# Patient Record
Sex: MALE | Race: BLACK OR AFRICAN AMERICAN | Employment: FULL TIME | URBAN - METROPOLITAN AREA
[De-identification: names, ages, dates, MRNs, and addresses within clinical notes are randomized per-mention and may not be internally consistent; named-entity substitution may affect disease eponyms.]

---

## 2019-02-19 ENCOUNTER — HOSPITAL ENCOUNTER (OUTPATIENT)
Dept: MRI IMAGING | Age: 59
Discharge: HOME | End: 2019-02-19
Attending: NEUROMUSCULOSKELETAL MEDICINE, SPORTS MEDICINE
Payer: OTHER MISCELLANEOUS

## 2019-02-19 DIAGNOSIS — M23.90: ICD-10-CM

## 2019-02-19 PROCEDURE — 73721 MRI JNT OF LWR EXTRE W/O DYE: CPT

## 2022-06-13 ENCOUNTER — HOSPITAL ENCOUNTER (EMERGENCY)
Age: 62
Discharge: LEFT BEFORE DISCHARGE | End: 2022-06-13
Attending: STUDENT IN AN ORGANIZED HEALTH CARE EDUCATION/TRAINING PROGRAM

## 2022-06-13 VITALS
HEART RATE: 69 BPM | SYSTOLIC BLOOD PRESSURE: 138 MMHG | BODY MASS INDEX: 24.38 KG/M2 | DIASTOLIC BLOOD PRESSURE: 87 MMHG | RESPIRATION RATE: 19 BRPM | TEMPERATURE: 98 F | WEIGHT: 190 LBS | OXYGEN SATURATION: 99 % | HEIGHT: 74 IN

## 2022-06-13 PROCEDURE — 75810000275 HC EMERGENCY DEPT VISIT NO LEVEL OF CARE
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
